# Patient Record
(demographics unavailable — no encounter records)

---

## 2025-02-28 NOTE — ASSESSMENT
[FreeTextEntry1] : 1) Tinea pedis, feet - Reviewed risks (as well as mitigation strategies for adverse drug events as applicable), benefits, and alternatives of therapy  - Start terbinafine cream BID to AA for at least 4-6 weeks. would continue to use up to 2 weeks after resolution   2) Hyperpigmentation, right arm, 2/2 minor skin injury - Differential includes post-inflammatory hyperpigmentation and scar - Stopped hydroquinone 2/2 lightening of surrounding skin - Discussed consideration of cosmetic referral with Dr. Cedrick Yanes. Pt and mother to consider  RTC prn

## 2025-02-28 NOTE — HISTORY OF PRESENT ILLNESS
[FreeTextEntry1] : f/u tinea [de-identified] : 10 y/o F w/ recent diagnosis of tinea pedis. Started on ciclopirox gel. Also continues to have dark marks on the right arm.   Hx: dysesthesia on the feet

## 2025-02-28 NOTE — PHYSICAL EXAM
[FreeTextEntry3] : hyperpigmented linear patches on the right arm maceration on the interdigital toewebs  Patient/parent declined female chaperone during exam

## 2025-05-15 NOTE — HISTORY OF PRESENT ILLNESS
[FreeTextEntry2] : Kareen is a 10 year 4 month old girl referred by her pediatrician for an initial evaluation of her growth/puberty.  Growth charts show steady growth in height at the 25-50% and steady weight gain at the 25-50%. A bone age was read as 10 years at CA 10 years. Laboratory testing from 1/2025 shows normal CBC, CMP, ESR, TTG IgA, and IGFBP-3.

## 2025-05-15 NOTE — CONSULT LETTER
[Dear  ___] : Dear  [unfilled], [Consult Letter:] : I had the pleasure of evaluating your patient, [unfilled]. [Please see my note below.] : Please see my note below. [Consult Closing:] : Thank you very much for allowing me to participate in the care of this patient.  If you have any questions, please do not hesitate to contact me. [Sincerely,] : Sincerely, [FreeTextEntry3] : Yisel Dior MD

## 2025-05-27 NOTE — PHYSICAL EXAM
[3] : was Selvin stage 3 [Selvin Stage ___] : the Selvin stage for breast development was [unfilled] [Murmur] : no murmurs

## 2025-05-27 NOTE — FAMILY HISTORY
[___ inches] : [unfilled] inches [FreeTextEntry5] : 10-11 [FreeTextEntry4] : MGM 61 in, MGF 68 in, PGM 60-61 in, PGF 66-67 in

## 2025-05-27 NOTE — HISTORY OF PRESENT ILLNESS
[Premenarchal] : premenarchal [Headaches] : no headaches [Visual Symptoms] : no ~T visual symptoms [Polyuria] : no polyuria [Polydipsia] : no polydipsia [Knee Pain] : no knee pain [Hip Pain] : no hip pain [Constipation] : no constipation [Fatigue] : no fatigue [Anorexia] : no anorexia [Abdominal Pain] : no abdominal pain [Nausea] : no nausea [Vomiting] : no vomiting [FreeTextEntry2] : Kareen is a 10 year 4 month old girl referred by her pediatrician for an initial evaluation of her growth/puberty.  Growth charts show steady growth in height at the 25-50% and steady weight gain at the 25-50%. A bone age was read as 10 years at CA 10 years. Laboratory testing from 1/2025 shows normal CBC, CMP, ESR, TTG IgA, and IGFBP-3. I read her bone age as 10-11 years.  Kareen's mother reports that 1-2 years ago they noted pubic hair development. This past fall (a few months before turning 10 years old) they noted breast development. She was last seen by her pediatrician this past January who noted her to be in puberty and referred her to endocrinology. She had grown ~2 inches in the past year by report. He performed a bone age, read as consistent with her age, and laboratory testing and noted one level to be out of range.

## 2025-05-27 NOTE — PAST MEDICAL HISTORY
[At Term] : at term [Normal Vaginal Route] : by normal vaginal route [Age Appropriate] : age appropriate developmental milestones met [FreeTextEntry1] : 7 lb 4 oz [FreeTextEntry4] : gestational diabetes, diet controlled